# Patient Record
Sex: FEMALE | Race: WHITE | NOT HISPANIC OR LATINO | URBAN - METROPOLITAN AREA
[De-identification: names, ages, dates, MRNs, and addresses within clinical notes are randomized per-mention and may not be internally consistent; named-entity substitution may affect disease eponyms.]

---

## 2021-08-26 ENCOUNTER — APPOINTMENT (RX ONLY)
Dept: URBAN - METROPOLITAN AREA CLINIC 23 | Facility: CLINIC | Age: 86
Setting detail: DERMATOLOGY
End: 2021-08-26

## 2021-09-17 ENCOUNTER — APPOINTMENT (RX ONLY)
Dept: URBAN - METROPOLITAN AREA CLINIC 23 | Facility: CLINIC | Age: 86
Setting detail: DERMATOLOGY
End: 2021-09-17

## 2021-09-17 DIAGNOSIS — Z71.89 OTHER SPECIFIED COUNSELING: ICD-10-CM

## 2021-09-17 DIAGNOSIS — L81.4 OTHER MELANIN HYPERPIGMENTATION: ICD-10-CM

## 2021-09-17 DIAGNOSIS — L82.1 OTHER SEBORRHEIC KERATOSIS: ICD-10-CM

## 2021-09-17 DIAGNOSIS — L57.8 OTHER SKIN CHANGES DUE TO CHRONIC EXPOSURE TO NONIONIZING RADIATION: ICD-10-CM

## 2021-09-17 PROCEDURE — ? COUNSELING

## 2021-09-17 PROCEDURE — ? OTHER

## 2021-09-17 PROCEDURE — 99203 OFFICE O/P NEW LOW 30 MIN: CPT

## 2021-09-17 ASSESSMENT — LOCATION DETAILED DESCRIPTION DERM
LOCATION DETAILED: LEFT INFERIOR LATERAL NECK
LOCATION DETAILED: LEFT INFERIOR MEDIAL FOREHEAD
LOCATION DETAILED: RIGHT SUPERIOR PARIETAL SCALP
LOCATION DETAILED: LEFT SUPERIOR CENTRAL BUCCAL CHEEK
LOCATION DETAILED: LEFT VENTRAL DISTAL FOREARM
LOCATION DETAILED: RIGHT CENTRAL FRONTAL SCALP
LOCATION DETAILED: RIGHT INFERIOR ANTERIOR NECK
LOCATION DETAILED: RIGHT INFERIOR MEDIAL MALAR CHEEK
LOCATION DETAILED: LEFT CENTRAL MALAR CHEEK
LOCATION DETAILED: LEFT INFERIOR CENTRAL MALAR CHEEK
LOCATION DETAILED: RIGHT INFERIOR MEDIAL FOREHEAD
LOCATION DETAILED: RIGHT VENTRAL DISTAL FOREARM

## 2021-09-17 ASSESSMENT — LOCATION SIMPLE DESCRIPTION DERM
LOCATION SIMPLE: RIGHT CHEEK
LOCATION SIMPLE: RIGHT FOREHEAD
LOCATION SIMPLE: SCALP
LOCATION SIMPLE: LEFT FOREHEAD
LOCATION SIMPLE: RIGHT FOREARM
LOCATION SIMPLE: LEFT ANTERIOR NECK
LOCATION SIMPLE: LEFT CHEEK
LOCATION SIMPLE: LEFT FOREARM
LOCATION SIMPLE: RIGHT ANTERIOR NECK

## 2021-09-17 ASSESSMENT — LOCATION ZONE DERM
LOCATION ZONE: FACE
LOCATION ZONE: SCALP
LOCATION ZONE: NECK
LOCATION ZONE: ARM

## 2021-09-17 NOTE — PROCEDURE: OTHER
Detail Level: Detailed
Other (Free Text): Discussed cosmetic removal of SK’s with cryotherapy and ED&C However insurance may not cover.\\nShe will consider her options
Render Risk Assessment In Note?: no
Note Text (......Xxx Chief Complaint.): This diagnosis correlates with the

## 2021-09-17 NOTE — HPI: SKIN LESION
What Type Of Note Output Would You Prefer (Optional)?: Standard Output
How Severe Is Your Skin Lesion?: mild
Has Your Skin Lesion Been Treated?: not been treated
Is This A New Presentation, Or A Follow-Up?: Skin Lesions
Additional History: Daughter with patient

## 2022-05-31 ENCOUNTER — TELEPHONE (OUTPATIENT)
Dept: ORTHOPEDIC SURGERY | Age: 87
End: 2022-05-31

## 2022-05-31 NOTE — TELEPHONE ENCOUNTER
Please ector next avil, no work in this week calixto is off they will have to call back next week to try and get in sooner.

## 2022-05-31 NOTE — TELEPHONE ENCOUNTER
Pt wants a left  Leg cortisone  Injection  She  Can  Go  To  Either office  But prefers  [facility]

## 2022-05-31 NOTE — TELEPHONE ENCOUNTER
Pt ask to be  Scheduled  For  An injection  The  Week of  June 13th  With  Jaspreet Najera. They will be  Gone  Next  Week.'  So when calixto  Comes  Back  Can  She  Set them up and  Call this  Pt?   Thank you

## 2022-06-13 ENCOUNTER — OFFICE VISIT (OUTPATIENT)
Dept: ORTHOPEDIC SURGERY | Age: 87
End: 2022-06-13
Payer: MEDICARE

## 2022-06-13 DIAGNOSIS — M16.12 PRIMARY OSTEOARTHRITIS OF LEFT HIP: ICD-10-CM

## 2022-06-13 DIAGNOSIS — M51.36 DDD (DEGENERATIVE DISC DISEASE), LUMBAR: ICD-10-CM

## 2022-06-13 DIAGNOSIS — M47.816 FACET ARTHROPATHY, LUMBAR: ICD-10-CM

## 2022-06-13 DIAGNOSIS — M54.16 LUMBAR RADICULOPATHY: Primary | ICD-10-CM

## 2022-06-13 PROCEDURE — G8400 PT W/DXA NO RESULTS DOC: HCPCS | Performed by: PHYSICIAN ASSISTANT

## 2022-06-13 PROCEDURE — 1090F PRES/ABSN URINE INCON ASSESS: CPT | Performed by: PHYSICIAN ASSISTANT

## 2022-06-13 PROCEDURE — 4004F PT TOBACCO SCREEN RCVD TLK: CPT | Performed by: PHYSICIAN ASSISTANT

## 2022-06-13 PROCEDURE — 1123F ACP DISCUSS/DSCN MKR DOCD: CPT | Performed by: PHYSICIAN ASSISTANT

## 2022-06-13 PROCEDURE — 99213 OFFICE O/P EST LOW 20 MIN: CPT | Performed by: PHYSICIAN ASSISTANT

## 2022-06-13 PROCEDURE — G8421 BMI NOT CALCULATED: HCPCS | Performed by: PHYSICIAN ASSISTANT

## 2022-06-13 PROCEDURE — G8427 DOCREV CUR MEDS BY ELIG CLIN: HCPCS | Performed by: PHYSICIAN ASSISTANT

## 2022-06-13 RX ORDER — HYDROCODONE BITARTRATE AND ACETAMINOPHEN 10; 325 MG/1; MG/1
TABLET ORAL
COMMUNITY
Start: 2022-06-01

## 2022-06-13 RX ORDER — NITROFURANTOIN 25; 75 MG/1; MG/1
CAPSULE ORAL
COMMUNITY
Start: 2022-03-30

## 2022-06-13 RX ORDER — EZETIMIBE 10 MG/1
TABLET ORAL
COMMUNITY
Start: 2022-04-01

## 2022-06-13 RX ORDER — OMEPRAZOLE 40 MG/1
CAPSULE, DELAYED RELEASE ORAL
COMMUNITY
Start: 2022-04-07

## 2022-06-13 RX ORDER — ALBUTEROL SULFATE 90 UG/1
AEROSOL, METERED RESPIRATORY (INHALATION)
COMMUNITY
Start: 2022-04-01

## 2022-06-13 RX ORDER — GABAPENTIN 600 MG/1
TABLET ORAL
COMMUNITY
Start: 2022-04-01

## 2022-06-13 NOTE — PATIENT INSTRUCTIONS
Patient Education        Hip Arthritis: Exercises  Introduction  Here are some examples of exercises for you to try. The exercises may be suggested for a condition or for rehabilitation. Start each exercise slowly. Ease off the exercises if you start to have pain. You will be told when to start these exercises and which ones will work bestfor you. How to do the exercises  Straight-leg raises to the outside    1. Lie on your side, with your affected hip on top. 2. Tighten the front thigh muscles of your top leg to keep your knee straight. 3. Keep your hip and your leg straight in line with the rest of your body, and keep your knee pointing forward. Do not drop your hip back. 4. Lift your top leg straight up toward the ceiling, about 12 inches off the floor. Hold for about 6 seconds, then slowly lower your leg. 5. Repeat 8 to 12 times. 6. Switch legs and repeat steps 1 through 5, even if only one hip is sore. Straight-leg raises to the inside    1. Lie on your side with your affected hip on the floor. 2. You can either prop up your other leg on a chair, or you can bend that knee and put that foot in front of your other knee. Do not drop your hip back. 3. Tighten the muscles on the front thigh of your bottom leg to straighten that knee. 4. Keep your kneecap pointing forward and your leg straight, and lift your bottom leg up toward the ceiling about 6 inches. Hold for about 6 seconds, then lower slowly. 5. Repeat 8 to 12 times. 6. Switch legs and repeat steps 1 through 5, even if only one hip is sore. Hip hike    1. Stand sideways on the bottom step of a staircase, and hold on to the banister or wall. 2. Keeping both knees straight, lift your good leg off the step and let it hang down. Then hike your good hip up to the same level as your affected hip or a little higher. 3. Repeat 8 to 12 times. 4. Switch legs and repeat steps 1 through 3, even if only one hip is sore. Bridging    1.  Lie on your back with both knees bent. Your knees should be bent about 90 degrees. 2. Then push your feet into the floor, squeeze your buttocks, and lift your hips off the floor until your shoulders, hips, and knees are all in a straight line. 3. Hold for about 6 seconds as you continue to breathe normally, and then slowly lower your hips back down to the floor and rest for up to 10 seconds. 4. Repeat 8 to 12 times. Hamstring stretch (lying down)    1. Lie flat on your back with your legs straight. If you feel discomfort in your back, place a small towel roll under your lower back. 2. Holding the back of your affected leg, lift your leg straight up and toward your body until you feel a stretch at the back of your thigh. 3. Hold the stretch for at least 30 seconds. 4. Repeat 2 to 4 times. 5. Switch legs and repeat steps 1 through 4, even if only one hip is sore. Standing quadriceps stretch    1. If you are not steady on your feet, hold on to a chair, counter, or wall. You can also lie on your stomach or your side to do this exercise. 2. Bend the knee of the leg you want to stretch, and reach behind you to grab the front of your foot or ankle with the hand on the same side. For example, if you are stretching your right leg, use your right hand. 3. Keeping your knees next to each other, pull your foot toward your buttock until you feel a gentle stretch across the front of your hip and down the front of your thigh. Your knee should be pointed directly to the ground, and not out to the side. 4. Hold the stretch for at least 15 to 30 seconds. 5. Repeat 2 to 4 times. 6. Switch legs and repeat steps 1 through 5, even if only one hip is sore. Hip rotator stretch    1. Lie on your back with both knees bent and your feet flat on the floor. 2. Put the ankle of your affected leg on your opposite thigh near your knee.   3. Use your hand to gently push your knee away from your body until you feel a gentle stretch around your hip.  4. Hold the stretch for 15 to 30 seconds. 5. Repeat 2 to 4 times. 6. Repeat steps 1 through 5, but this time use your hand to gently pull your knee toward your opposite shoulder. 7. Switch legs and repeat steps 1 through 6, even if only one hip is sore. Knee-to-chest    1. Lie on your back with your knees bent and your feet flat on the floor. 2. Bring your affected leg to your chest, keeping the other foot flat on the floor (or keeping the other leg straight, whichever feels better on your lower back). 3. Keep your lower back pressed to the floor. Hold for at least 15 to 30 seconds. 4. Relax, and lower the knee to the starting position. 5. Repeat 2 to 4 times. 6. Switch legs and repeat steps 1 through 5, even if only one hip is sore. 7. To get more stretch, put your other leg flat on the floor while pulling your knee to your chest.  Clamshell    1. Lie on your side, with your affected hip on top. Keep your feet and knees together and your knees bent. 2. Raise your top knee, but keep your feet together. Do not let your hips roll back. Your legs should open up like a clamshell. 3. Hold for 6 seconds. 4. Slowly lower your knee back down. Rest for 10 seconds. 5. Repeat 8 to 12 times. 6. Switch legs and repeat steps 1 through 5, even if only one hip is sore. Follow-up care is a key part of your treatment and safety. Be sure to make and go to all appointments, and call your doctor if you are having problems. It's also a good idea to know your test results and keep alist of the medicines you take. Where can you learn more? Go to https://[employer].Adhesive.co. org and sign in to your [employer] account. Enter L772 in the [employer] box to learn more about \"Hip Arthritis: Exercises. \"     If you do not have an account, please click on the \"Sign Up Now\" link. Current as of: July 1, 2021               Content Version: 13.2  © 2511-6515 Healthwise, Incorporated.    Care instructions adapted under license by [facility] ([facility]). If you have questions about a medical condition or this instruction, always ask your healthcare professional. [address] any warranty or liability for your use of this information. Patient Education        Learning About Total Hip Replacement Surgery  What is total hip replacement surgery? During total hip replacement surgery, your doctor replaces the worn parts ofyour hip joint with artificial parts made of metal, ceramic, or plastic. You may want this surgery if you have hip pain and trouble moving that you can't treat in other ways. Osteoarthritis or rheumatoid arthritis can causethese types of problems. Another cause is bone loss due to a poor blood supply. Hip replacement is sometimes done after a hip fracture. How is this surgery done? Hip replacement surgery is done through one or two cuts (incisions). The cuts may be toward the front (anterior) of your hip, or they may be on the side or toward the back (posterior). You and your doctor can discuss which surgery isbest for you. You may have anesthesia to block pain and medicine to make you drowsy. Or youmay get medicine to make you sleep. After making the incision, your doctor will:   Remove worn bone tissue and cartilage from the hip joint.  Replace the ball at the upper end of your thighbone (femur).  Replace your hip socket with a shell and liner.  Fit the ball into the shell and liner to make a new hip joint. There are two kinds of replacement joints.  Cemented joints. The cement fits between the new joint and the bone.  Uncemented joints. These have a metal coating with many small openings. The bone is shaped to fit the new joint almost perfectly. At first, there will be some small spaces between the bone and the new joint. Over time, the bone grows to fill these small openings. Sometimes a doctor uses a cemented ball and an uncemented socket.   Your doctor can tell you which type of new hip joint is best for you. What can you expect after a total hip replacement? On the day of surgery, you'll learn how to get in and out of bed. Miguelina Martínez also learn how to walk with a walker, crutches, or a cane. By the time you leave the hospital, you'll be able to safely sit down and stand up, dress yourself, usethe toilet, and bathe. Miguelina Martínez also start physical therapy. Your therapist will teach you exercises to help you get stronger. You'll learn ways to move your body without dislocatingyour hip. During the first week or so after surgery, you will need less and less pain medicine. For a few weeks after surgery, you will probably take medicine toprevent blood clots. Your doctor will tell you when you can walk on your own, drive, return to work,and get back to other activities. It usually takes a few months to get back to full activity. Follow-up care is a key part of your treatment and safety. Be sure to make and go to all appointments, and call your doctor if you are having problems. It's also a good idea to know your test results and keep alist of the medicines you take. Where can you learn more? Go to https://[employer].uFaber. org and sign in to your [employer] account. Enter L900 in the [facility] box to learn more about \"Learning About Total Hip Replacement Surgery. \"     If you do not have an account, please click on the \"Sign Up Now\" link. Current as of: July 1, 2021               Content Version: 13.2  © 2006-2022 Healthwise, Incorporated. Care instructions adapted under license by [facility] ([facility]). If you have questions about a medical condition or this instruction, always ask your healthcare professional. [place] [address] any warranty or liability for your use of this information. Patient Education        Hip Replacement Surgery (Posterior):  What to Expect at Home  Your Recovery  Hip replacement surgery replaces the worn parts of your hip joint. When you leave the hospital, you will probably be walking with crutches or a walker. You may be able to climb a few stairs and get in and out of bed and chairs. But you will need someone to help you at home until you have more energy and can movearound better. You will go home with a bandage and stitches, staples, skin glue, or tape strips. You can remove the bandage when your doctor tells you to. If you have stitches or staples, your doctor will remove them about 2 weeks after your surgery. Glue or tape strips will fall off on their own over time. You may still have some mild pain, and the area may be swollen for 3 to 4 months aftersurgery. Your doctor may give you medicine for the pain. You will continue the rehabilitation program (rehab) you started in the hospital. The better you do with your rehab exercises, the sooner you will get your strength and movement back. Most people are able to return to work 4 weeksto 4 months after surgery. This care sheet gives you a general idea about how long it will take for you to recover. But each person recovers at a different pace. Follow the steps belowto get better as quickly as possible. How can you care for yourself at home? Activity     Your doctor may not want your affected leg to cross the center of your body toward the other leg. If so, your therapist may suggest these ideas:  ? Do not cross your legs. ? Be very careful as you get in or out of bed or a car so your leg does not cross the imaginary line in the middle of your body.      Go slowly when you climb stairs. Make sure the lights are on. Have someone watch you, if you can. When you climb stairs:  ? Step up first with your unaffected leg. Then bring the affected leg up to the same step. Bring your crutches or cane up. ? To go down stairs, reverse the order. First, put your crutches or cane on the lower step. Then bring the affected leg down to that step.  Finally, step down with the unaffected leg.      You can ride in a car, but stop at least once every hour to get out and walk around.      You may want to sleep on your back. Don't reach down too far to pull up blankets when you lie in bed.      If your doctor recommends exercises, do them as directed. You can cut back on your exercises if your muscles start to ache, but don't stop doing them.      Rest when you feel tired. You may take a nap, but don't stay in bed all day.      Work with your physical therapist to learn the best way to exercise. You will probably have to use a walker, crutches, or a cane for at least 4 to 6 weeks.      Your doctor may advise you to stay away from activities that put stress on the joint. This includes sports such as tennis, football, and jogging.      Try not to sit for too long at one time. You will feel less stiff if you take a short walk about every hour. When you sit, use chairs with arms, and don't sit in low chairs.      Do not bend over more than 90 degrees (like the angle in a letter \"L\").      Sleep on your back with your legs slightly apart or on your side with a pillow between your knees for about 6 weeks or as your doctor tells you. Do not sleep on your stomach or affected leg.      Ask your doctor when you can drive again.      Most people are able to return to work 4 weeks to 4 months after surgery.      Ask your doctor when it is okay for you to have sex. Diet     By the time you leave the hospital, you will probably be eating your normal diet. Your doctor may recommend that you take iron and vitamin supplements.      Drink plenty of fluids (unless your doctor tells you not to).    Eat healthy foods, and watch your portion sizes. Try to stay at your ideal weight. Too much weight puts more stress on your new hip joint.      You may notice that your bowel movements are not regular right after your surgery. This is common.  Try to avoid constipation and straining with bowel movements. You may want to take a fiber supplement every day. If you have not had a bowel movement after a couple of days, ask your doctor about taking a mild laxative. Medicines     Your doctor will tell you if and when you can restart your medicines. You will also get instructions about taking any new medicines.      If you take aspirin or some other blood thinner, ask your doctor if and when to start taking it again. Make sure that you understand exactly what your doctor wants you to do.      Your doctor may give you a blood-thinning medicine to prevent blood clots. If you take a blood thinner, be sure you get instructions about how to take your medicine safely. Blood thinners can cause serious bleeding problems. This medicine could be in pill form or as a shot (injection). If a shot is necessary, your doctor will tell you how to do this.      Be safe with medicines. Take pain medicines exactly as directed. ? If the doctor gave you a prescription medicine for pain, take it as prescribed. ? If you are not taking a prescription pain medicine, ask your doctor if you can take an over-the-counter medicine.      If you think your pain medicine is making you sick to your stomach:  ? Take your medicine after meals (unless your doctor has told you not to). ? Ask your doctor for a different pain medicine.      If your doctor prescribed antibiotics, take them as directed. Do not stop taking them just because you feel better. You need to take the full course of antibiotics. Incision care     If your doctor told you how to care for your cut (incision), follow your doctor's instructions. You will have a dressing over the cut. A dressing helps the incision heal and protects it. Your doctor will tell you how to take care of this.      If you did not get instructions, follow this general advice:  ?  If you have strips of tape on the cut the doctor made, leave the tape on for a week or until it falls off.  ? If you have stitches or staples, your doctor will tell you when to come back to have them removed. ? If you have skin glue on the cut, leave it on until it falls off. Skin glue is also called skin adhesive or liquid stitches. ? Change the bandage every day. ? Wash the area daily with warm water, and pat it dry. Don't use hydrogen peroxide or alcohol. They can slow healing. ? You may cover the area with a gauze bandage if it oozes fluid or rubs against clothing. ? You may shower 24 to 48 hours after surgery. Pat the incision dry. Don't swim or take a bath for the first 2 weeks, or until your doctor tells you it is okay. Exercise     Your physical therapist will teach you exercises to do at home. Always do them as your therapist tells you.      Avoid activities where you might fall. Ice and elevation     For pain, put ice or a cold pack on the area for 10 to 20 minutes at a time. Put a thin cloth between the ice and your skin. If your doctor recommended cold therapy using a portable machine, follow the instructions that came with the machine.      Your ankle may swell for about 3 months. Prop up your ankle when you ice it or anytime you sit or lie down. Try to keep it above the level of your heart. This will help reduce swelling. Other instructions     Wear compression stockings if your doctor told you to. These may help to prevent blood clots. Your doctor will tell you how long you need to keep wearing the compression stockings.      Try to prevent falls. To avoid falling:  ? Arrange furniture so that you will not trip on it. ? Get rid of throw rugs, and move electrical cords out of the way. ? Walk only in areas with plenty of light. ? Put grab bars in showers and bathtubs. ? Try to avoid icy or snowy sidewalks. Choose shoes with good traction, or consider using traction devices that attach to your shoes. ? Wear shoes with sturdy, flat soles. Follow-up care is a key part of your treatment and safety. Be sure to make and go to all appointments, and call your doctor if you are having problems. It's also a good idea to know your test results and keep alist of the medicines you take. When should you call for help? Call 911 anytime you think you may need emergency care. For example, call if:     You passed out (lost consciousness).      You have severe trouble breathing.      You have sudden chest pain and shortness of breath, or you cough up blood. Call your doctor now or seek immediate medical care if:     You have signs that your hip may be dislocated, including:  ? Severe pain and not being able to stand. ? A crooked leg that looks like your hip is out of position. ? Not being able to bend or straighten your leg.      Your leg or foot is cool or pale or changes color.      You cannot feel or move your leg.      You have signs of a blood clot, such as:  ? Pain in your calf, back of the knee, thigh, or groin. ? Redness and swelling in your leg or groin.      Your incision comes open and begins to bleed, or the bleeding increases.      You feel like your heart is racing or beating irregularly.      You have signs of infection, such as:  ? Increased pain, swelling, warmth, or redness. ? Red streaks leading from the incision. ? Pus draining from the incision. ? A fever. Watch closely for changes in your health, and be sure to contact your doctor if:     You do not have a bowel movement after taking a laxative.      You do not get better as expected. Where can you learn more? Go to https://ASSIAfelicia.iBoxPay. org and sign in to your [employer] account. Enter R406 in the [facility] box to learn more about \"Hip Replacement Surgery (Posterior): What to Expect at Home. \"     If you do not have an account, please click on the \"Sign Up Now\" link. Current as of: July 1, 2021               Content Version: 13.2  © 6905-6324 Healthwise, Incorporated.    Care instructions adapted under license by [facility] ([facility]). If you have questions about a medical condition or this instruction, always ask your healthcare professional. [address] any warranty or liability for your use of this information. Patient Education        Hip Replacement (Posterior) Precautions: What to Expect at Home  Your Recovery  Hip replacement surgery replaces the worn parts of your hip joint. You will need to be careful to protect your new joint after hip replacement surgery. Along with doing your physical therapy exercises, there are many things you can do to help your hip heal. Your recovery may be faster if youfollow these precautions. Try to keep your How can you care for yourself at home? What are some precautions for self-care after hip replacement surgery (posterior)? 1. Keep your toes pointing forward or slightly out. Don't rotate your leg too far to the inside. 2. Do not bend your hip more than 90 degrees. 3. Keep your knees apart. Don't cross your legs. Hip Replacement (Posterior) Precautions: Don't bend your hip too far    1. Don't lean forward while you sit down or stand up, and don't bend past 90 degrees (like the angle in a letter \"L\"). This means you can't try to  something off the floor or bend down to tie your shoes. 2. Don't lift your knee higher than your hip. 3. Don't sit on low chairs, beds, or toilets. You may want to use a raised toilet seat for a while. Sit in chairs with arms. Hip Replacement (Posterior) Precautions: Don't cross your legs    1. Imagine there's a line running down the middle of your body. Keep your legs from crossing over it. 2. When you get into a car, back up to the seat of the car, and then sit and slide across the seat toward the middle of the car with your knees about 12 inches apart. A plastic bag on the seat can help you slide in and out of the car. 1. Don't cross your legs when you sit.   2. Don't cross your ankles while lying down.  3. It may help to keep a pillow between your knees when you're in bed. Other tips   Go slowly when you climb stairs. Make sure the lights are on. Have someone watch you, if you can. When you climb stairs:  ? Step up first with your unaffected leg. Then bring the affected leg up to the same step. Bring your crutches or cane up. ? To go down stairs, reverse the order. First, put your crutches or cane on the lower step. Then bring the affected leg down to that step. Finally, step down with the unaffected leg.  You can ride in a car, but stop at least once every hour to get out and walk around.  You may want to sleep on your back. Don't reach down too far to pull up blankets when you lie in bed.  If your doctor recommends exercises, do them as directed. You can cut back on your exercises if your muscles start to ache, but don't stop doing them. Follow-up care is a key part of your treatment and safety. Be sure to make and go to all appointments, and call your doctor if you are having problems. It's also a good idea to know your test results and keep alist of the medicines you take. When should you call for help? Call 911 anytime you think you may need emergency care. For example, call if:     You passed out (lost consciousness).      You have sudden chest pain and shortness of breath, or you cough up blood.      You have severe pain in your chest.   Call your doctor now or seek immediate medical care if:     You have signs that your hip may be dislocated, including:  ? Severe pain and not being able to stand. ? A crooked leg that looks like your hip is out of position. ? Not being able to bend or straighten your leg.      Your leg or foot turns cold or changes color.      You have tingling, weakness, or numbness in your leg or foot.      You have signs of a blood clot, such as:  ? Pain in your calf, back of the knee, thigh, or groin. ?  Redness and swelling in your leg or groin.      You have pain that does not get better after you take pain medicine.      Your incision opens and starts to bleed, or there's more bleeding.      You have signs of infection, such as:  ? Increased pain, swelling, warmth, or redness. ? Red streaks leading from the incision. ? Pus draining from the incision. ? A fever. Watch closely for changes in your health, and be sure to contact your doctor if:     You do not have a bowel movement after taking a laxative.      You do not get better as expected. Where can you learn more? Go to https://Cerenis Therapeuticspepiceweb.[employer] Corporation. org and sign in to your [employer] account. Enter S979 in the [facility] box to learn more about \"Hip Replacement (Posterior) Precautions: What to Expect at Home. \"     If you do not have an account, please click on the \"Sign Up Now\" link. Current as of: July 1, 2021               Content Version: 13.2  © 2006-2022 [employer]. Care instructions adapted under license by [facility] [place] ([facility]). If you have questions about a medical condition or this instruction, always ask your healthcare professional. [place] [address] any warranty or liability for your use of this information. Patient Education        Low Back Arthritis: Exercises  Introduction  Here are some examples of typical rehabilitation exercises for your condition. Start each exercise slowly. Ease off the exercise if you start to have pain. Your doctor or physical therapist will tell you when you can start theseexercises and which ones will work best for you. When you are not being active, find a comfortable position for rest. Some people are comfortable on the floor or a medium-firm bed with a small pillow under their head and another under their knees. Some people prefer to lie on their side with a pillow between their knees. Don't stay in one position fortoo long. Take short walks (10 to 20 minutes) every 2 to 3 hours.  Avoid slopes, hills, and stairs until you feel better. Walk only distances you can manage withoutpain, especially leg pain. How to do the exercises  Pelvic tilt    6. Lie on your back with your knees bent. 7. \"Brace\" your stomach--tighten your muscles by pulling in and imagining your belly button moving toward your spine. 8. Press your lower back into the floor. You should feel your hips and pelvis rock back. 9. Hold for 6 seconds while breathing smoothly. 10. Relax and allow your pelvis and hips to rock forward. 11. Repeat 8 to 12 times. Back stretches    4. Get down on your hands and knees on the floor. 5. Relax your head and allow it to droop. Round your back up toward the ceiling until you feel a nice stretch in your upper, middle, and lower back. Hold this stretch for as long as it feels comfortable, or about 15 to 30 seconds. 6. Return to the starting position with a flat back while you are on your hands and knees. 7. Let your back sway by pressing your stomach toward the floor. Lift your buttocks toward the ceiling. 8. Hold this position for 15 to 30 seconds. 9. Repeat 2 to 4 times. Follow-up care is a key part of your treatment and safety. Be sure to make and go to all appointments, and call your doctor if you are having problems. It's also a good idea to know your test results and keep alist of the medicines you take. Where can you learn more? Go to https://[email or web address]. org and sign in to your [employer] account. Enter J622 in the GetFeedback box to learn more about \"Low Back Arthritis: Exercises. \"     If you do not have an account, please click on the \"Sign Up Now\" link. Current as of: July 1, 2021               Content Version: 13.2  © 8853-9329 [employer], [employer]. Care instructions adapted under license by [facility] ([facility]).  If you have questions about a medical condition or this instruction, always ask your healthcare professional. Karie Solano disclaims any warranty or liability for your use of this information.

## 2022-06-13 NOTE — PROGRESS NOTES
Name: Storm Helms  YOB: 1935  Gender: female  MRN: [id number]    CC:   Chief Complaint   Patient presents with    Hip Pain     left    Lower Back Pain         HPI: Storm Helms is a 81 y.o. female with a PMHx of HTN, DM and hypothyroidism here for evaluation of left hip pain  There was not an acute injury  Regarding the hip pain, it has been present for years and is becoming worse. The pain is primarily lateral, over greater trochanter and posterior buttock area   She does admit to intermittent groin pain  Patient was last seen by Dr. Peng Chávez in January of this year for an intra-articular injection of the left hip. She states this provided very little improvement in symptoms. She would like a repeat injection today. she describes the pain as aching, throbbing and a constant ache that is intermittently sharp with activity  The pain is worse with going up and/or down stairs, rising after sitting, twisting, walking, at night, often waking them from sleep and lifting their leg  The pain does radiate down the leg down to the toes  she reports numbness and tingling down the leg. She does have a history of laminectomy of L5-S1 with Dr. Eloise Hallman in 2015. She does admit to some frequent lower back pain. She denies any changes in bowel or bladder habits. Treatment so far has been prescription and OTC NSAIDS which have not effectively relieved pain/inflammation, activity modification, Tylenol and Gabapentin with little relief. Review of Systems  As per HPI. Pertinent positives and negatives are addressed with the patient, particularly those related to musculoskeletal concerns. Non-orthopaedic concerns were referred back to the primary care physician. Allergies   Allergen Reactions    Lisinopril Other (See Comments)     General fatigue    Sulfa Antibiotics Nausea Only                    PHYSICAL EXAMINATION:   The patient is alert and oriented, in no distress.   There were no vitals filed for this visit. The cervical, thoracic and lumbar spine are without compromising deformity. The upper extremities demonstrate reasonable tone, strength and function bilaterally. The lower extremities are as described below. Circulation is normal with palpable pedal pulses bilaterally and no edema. Skin of the leg is normal with no chronic stasis disease bilaterally. Sensation is intact to light touch bilaterally. The gait is noted to be with a slight trendelenburg and antalgia  There is mild tenderness to palpation along the spinous processes and paraspinal musculature. There no tenderness to palpation over the left greater trochanter  left hip range of motion is 0-90 degrees of flexion and 20 degrees on abduction and adduction. There is 10 degrees internal rotation and 15 degrees of external rotation without groin pain  Positive Maki test on the left  Limb lengths are equal.  Straight leg test is Positive left  Stability is normal bilaterally. Strength testing in the lower extremity reveals the following based on the 5 point grading scale:     HF (L2) H Ab (L5) KE (L3/4) ADF (L4) EHL (L5) A Ev (S1) APF (S1)   Right 5 5 5 5 5 5 5   Left 5 5 5 5 5 5 5     Their judgment and insight are normal.  They are oriented to time, place and person. Their memory is good and the mood and affect appropriate. XRAYS:   AP pelvis and lateral views of the left hip from January 10, 2022 are reviewed. There is  Advanced joint space loss, eburnated bone and osteophyte formation of the hip. X-ray impression: Advanced osteoarthropathy of the left hip      IMPRESSION:      ICD-10-CM    1. Lumbar radiculopathy  M54.16    2. DDD (degenerative disc disease), lumbar  M51.36    3. Facet arthropathy, lumbar  M47.816    4. Primary osteoarthritis of left hip  M16.12          RECOMMENDATION:    X-rays were reviewed with the patient today. Patient does have significant osteoarthritis of the left hip.   I discussed her history and imaging with Dr. Derik Mata in office today. He agrees that if patient did not have any relief from intra-articular injection of the left hip that she would be better off scheduling hip replacement rather than repeat injection. I discussed this with the patient as well. At this time she is hesitant to schedule any replacement surgery and would like to exhaust all conservative management options prior to scheduling. With her history of pathology and surgical intervention with Dr. Donavan Ge, I recommend we get a repeat MRI of the lumbar spine and have her follow-up with the spine team for possible injections. If her pain significantly improves with injections then we can hold off on scheduling hip replacement however if her symptoms persist after injections I recommend left total hip replacement which will be scheduled with Dr. Tali Padron at the patient's discretion. This patient's clinical history and physical exam is consistent with osteoarthritis of the left hip(s). We discussed the natural history of this condition as a degenerative process that causes inflammation of the joints due to a breakdown of cartilage, the hard, thick tissue that cushions the joints. We discussed that osteoarthritis never completely resolves on its own and symptoms including pain, stiffness, and swelling may wax and wane as the condition progresses. She understands that conservative treatments typically include activity modification, NSAIDs and physical therapy. Oral and/or steroid injections into the joint could be considered in resistant scenarios. I advised avoiding any prolonged bedrest and to try to maintain ADLs as much as possible. The patient was counseled to follow up with me should she develop any worsening symptoms that begin to limit activities of daily living. This patient's clinical history and physical exam is consistent with a left  L4 and L5 lumbar radiculopathy.  We discussed the natural history of lumbar